# Patient Record
Sex: FEMALE | Race: WHITE
[De-identification: names, ages, dates, MRNs, and addresses within clinical notes are randomized per-mention and may not be internally consistent; named-entity substitution may affect disease eponyms.]

---

## 2018-08-12 ENCOUNTER — HOSPITAL ENCOUNTER (EMERGENCY)
Dept: HOSPITAL 62 - ER | Age: 48
Discharge: HOME | End: 2018-08-12
Payer: COMMERCIAL

## 2018-08-12 VITALS — DIASTOLIC BLOOD PRESSURE: 71 MMHG | SYSTOLIC BLOOD PRESSURE: 119 MMHG

## 2018-08-12 DIAGNOSIS — R22.0: ICD-10-CM

## 2018-08-12 DIAGNOSIS — F17.200: ICD-10-CM

## 2018-08-12 DIAGNOSIS — H57.11: ICD-10-CM

## 2018-08-12 DIAGNOSIS — H10.33: Primary | ICD-10-CM

## 2018-08-12 PROCEDURE — 99282 EMERGENCY DEPT VISIT SF MDM: CPT

## 2018-08-12 NOTE — ER DOCUMENT REPORT
ED Eye Complaint





- General


Chief Complaint: Eye Problem


Stated Complaint: SWOLLEN RIGHT EYE


Time Seen by Provider: 08/12/18 10:18


Mode of Arrival: Ambulatory


Information source: Patient


TRAVEL OUTSIDE OF THE [place] IN LAST 30 DAYS: No





- HPI


Onset: This morning


Eye location: Bilateral


Injury: No


Occurred at: Home


Quality of pain: Burning


Severity: Mild


Pain Level: 3


Contact lenses worn: No


Associated symptoms: Burning, Itching





- Related Data


Allergies/Adverse Reactions: 


 





Penicillins Allergy (Unknown, Verified 08/12/18 08:03)


 











Past Medical History





- Social History


Smoking Status: Current Every Day Smoker


Chew tobacco use (# tins/day): No


Frequency of alcohol use: Occasional


Drug Abuse: None


Family History: Reviewed & Not Pertinent


Patient has suicidal ideation: No


Patient has homicidal ideation: No


Renal/ Medical History: Denies: Hx Peritoneal Dialysis


Past Surgical History: Reports: Hx Tubal Ligation





- Immunizations


Hx Diphtheria, Pertussis, Tetanus Vaccination: Yes





Review of Systems





- Review of Systems


Constitutional: denies: Chills, Fever


EENT: Eye pain, Eye discharge, Tearing


Cardiovascular: No symptoms reported


Respiratory: No symptoms reported


Gastrointestinal: No symptoms reported


Genitourinary: No symptoms reported


Female Genitourinary: No symptoms reported


Musculoskeletal: No symptoms reported


Skin: No symptoms reported


Hematologic/Lymphatic: No symptoms reported


Neurological/Psychological: No symptoms reported


-: Yes All other systems reviewed and negative





Physical Exam





- Vital signs


Vitals: 


 











Temp Pulse Resp BP Pulse Ox


 


 98.7 F   79   18   120/68   99 


 


 08/12/18 08:07  08/12/18 08:07  08/12/18 08:07  08/12/18 08:07  08/12/18 08:07














- General


General appearance: Appears well, Alert


In distress: None





- HEENT


Head: Normocephalic, Atraumatic


Eyes: Other - Bilateral conjunctivitis


Conjunctiva: Injected, Purulent discharge


Cornea: Normal, Other - Fluoresein stain showed no corneal abrasion.


Extraocular movements intact: Yes


Eyelashes: Normal


Pupils: PERRL


Visual acuity- Right eye: 20/40


Visual acuity- Left eye: 20/50


Visual acuity- Both eyes: 20/40


Corrective lenses worn: No


Pharynx: Normal


Neck: Normal





- Respiratory


Respiratory status: No respiratory distress


Chest status: Nontender


Breath sounds: Normal


Chest palpation: Normal





- Cardiovascular


Rhythm: Regular


Heart sounds: Normal auscultation


Murmur: No





- Abdominal


Inspection: Normal


Distension: No distension


Bowel sounds: Normal


Tenderness: Nontender


Organomegaly: No organomegaly





- Extremities


General upper extremity: Normal inspection, Nontender, Normal color, Normal ROM

, Normal temperature


General lower extremity: Normal inspection, Nontender, Normal color, Normal ROM

, Normal temperature, Normal weight bearing.  No: Denis's sign





- Neurological


Neuro grossly intact: Yes


Cognition: Normal


Orientation: AAOx4


Massiel Coma Scale Eye Opening: Spontaneous


Whiteface Coma Scale Verbal: Oriented


Whiteface Coma Scale Motor: Obeys Commands


Whiteface Coma Scale Total: 15


Speech: Normal


Motor strength normal: LUE, RUE, LLE, RLE


Sensory: Normal





- Psychological


Associated symptoms: Normal affect, Normal mood





- Skin


Skin Temperature: Warm


Skin Moisture: Dry


Skin Color: Normal





Course





- Vital Signs


Vital signs: 


 











Temp Pulse Resp BP Pulse Ox


 


 98.3 F   63   16   119/71   97 


 


 08/12/18 11:45  08/12/18 11:45  08/12/18 11:45  08/12/18 11:45  08/12/18 11:45














Discharge





- Discharge


Clinical Impression: 


 Acute bacterial conjunctivitis of both eyes





Condition: Stable


Disposition: HOME, SELF-CARE


Instructions:  Conjunctivitis ([facility])


Additional Instructions: 


Please follow-up with your primary doctor tomorrow morning.  Return to the 

emergency room if condition worsens.


Prescriptions: 


Tobramycin Sulfate/Dexameth [Tobradex Oph Drops 2.5 Ml Bottle] 1 drop OU TID #1 

bottle


Referrals: 


KRISTAL ARORYO PA-C [Primary Care Provider] - Follow up as needed

## 2019-04-22 ENCOUNTER — HOSPITAL ENCOUNTER (EMERGENCY)
Dept: HOSPITAL 62 - ER | Age: 49
Discharge: HOME | End: 2019-04-22
Payer: COMMERCIAL

## 2019-04-22 VITALS — DIASTOLIC BLOOD PRESSURE: 75 MMHG | SYSTOLIC BLOOD PRESSURE: 119 MMHG

## 2019-04-22 DIAGNOSIS — K57.30: Primary | ICD-10-CM

## 2019-04-22 DIAGNOSIS — R10.9: ICD-10-CM

## 2019-04-22 DIAGNOSIS — Z79.899: ICD-10-CM

## 2019-04-22 DIAGNOSIS — R10.13: ICD-10-CM

## 2019-04-22 DIAGNOSIS — R10.2: ICD-10-CM

## 2019-04-22 DIAGNOSIS — F17.210: ICD-10-CM

## 2019-04-22 DIAGNOSIS — R11.2: ICD-10-CM

## 2019-04-22 DIAGNOSIS — D72.829: ICD-10-CM

## 2019-04-22 DIAGNOSIS — R10.31: ICD-10-CM

## 2019-04-22 LAB
ADD MANUAL DIFF: NO
ALBUMIN SERPL-MCNC: 4.7 G/DL (ref 3.5–5)
ALP SERPL-CCNC: 78 U/L (ref 38–126)
ALT SERPL-CCNC: 27 U/L (ref 9–52)
ANION GAP SERPL CALC-SCNC: 8 MMOL/L (ref 5–19)
APPEARANCE UR: (no result)
APTT PPP: YELLOW S
AST SERPL-CCNC: 24 U/L (ref 14–36)
BASOPHILS # BLD AUTO: 0 10^3/UL (ref 0–0.2)
BASOPHILS NFR BLD AUTO: 0.2 % (ref 0–2)
BILIRUB DIRECT SERPL-MCNC: 0.3 MG/DL (ref 0–0.4)
BILIRUB SERPL-MCNC: 0.4 MG/DL (ref 0.2–1.3)
BILIRUB UR QL STRIP: NEGATIVE
BUN SERPL-MCNC: 14 MG/DL (ref 7–20)
CALCIUM: 10.8 MG/DL (ref 8.4–10.2)
CHLORIDE SERPL-SCNC: 103 MMOL/L (ref 98–107)
CO2 SERPL-SCNC: 30 MMOL/L (ref 22–30)
EOSINOPHIL # BLD AUTO: 0 10^3/UL (ref 0–0.6)
EOSINOPHIL NFR BLD AUTO: 0.3 % (ref 0–6)
ERYTHROCYTE [DISTWIDTH] IN BLOOD BY AUTOMATED COUNT: 14 % (ref 11.5–14)
GLUCOSE SERPL-MCNC: 130 MG/DL (ref 75–110)
GLUCOSE UR STRIP-MCNC: NEGATIVE MG/DL
HCT VFR BLD CALC: 51.1 % (ref 36–47)
HGB BLD-MCNC: 17.3 G/DL (ref 12–15.5)
KETONES UR STRIP-MCNC: NEGATIVE MG/DL
LIPASE SERPL-CCNC: 98.8 U/L (ref 23–300)
LYMPHOCYTES # BLD AUTO: 0.8 10^3/UL (ref 0.5–4.7)
LYMPHOCYTES NFR BLD AUTO: 6 % (ref 13–45)
MCH RBC QN AUTO: 29 PG (ref 27–33.4)
MCHC RBC AUTO-ENTMCNC: 34 G/DL (ref 32–36)
MCV RBC AUTO: 86 FL (ref 80–97)
MONOCYTES # BLD AUTO: 0.5 10^3/UL (ref 0.1–1.4)
MONOCYTES NFR BLD AUTO: 3.4 % (ref 3–13)
NEUTROPHILS # BLD AUTO: 12.4 10^3/UL (ref 1.7–8.2)
NEUTS SEG NFR BLD AUTO: 90.1 % (ref 42–78)
NITRITE UR QL STRIP: NEGATIVE
PH UR STRIP: 6 [PH] (ref 5–9)
PLATELET # BLD: 346 10^3/UL (ref 150–450)
POTASSIUM SERPL-SCNC: 4.5 MMOL/L (ref 3.6–5)
PROT SERPL-MCNC: 7.8 G/DL (ref 6.3–8.2)
PROT UR STRIP-MCNC: NEGATIVE MG/DL
RBC # BLD AUTO: 5.97 10^6/UL (ref 3.72–5.28)
SODIUM SERPL-SCNC: 140.5 MMOL/L (ref 137–145)
SP GR UR STRIP: 1.02
TOTAL CELLS COUNTED % (AUTO): 100 %
UROBILINOGEN UR-MCNC: NEGATIVE MG/DL (ref ?–2)
WBC # BLD AUTO: 13.8 10^3/UL (ref 4–10.5)

## 2019-04-22 PROCEDURE — 96365 THER/PROPH/DIAG IV INF INIT: CPT

## 2019-04-22 PROCEDURE — S0028 INJECTION, FAMOTIDINE, 20 MG: HCPCS

## 2019-04-22 PROCEDURE — 81001 URINALYSIS AUTO W/SCOPE: CPT

## 2019-04-22 PROCEDURE — 74177 CT ABD & PELVIS W/CONTRAST: CPT

## 2019-04-22 PROCEDURE — 76705 ECHO EXAM OF ABDOMEN: CPT

## 2019-04-22 PROCEDURE — 99284 EMERGENCY DEPT VISIT MOD MDM: CPT

## 2019-04-22 PROCEDURE — 85025 COMPLETE CBC W/AUTO DIFF WBC: CPT

## 2019-04-22 PROCEDURE — 96361 HYDRATE IV INFUSION ADD-ON: CPT

## 2019-04-22 PROCEDURE — 74176 CT ABD & PELVIS W/O CONTRAST: CPT

## 2019-04-22 PROCEDURE — 80053 COMPREHEN METABOLIC PANEL: CPT

## 2019-04-22 PROCEDURE — 36415 COLL VENOUS BLD VENIPUNCTURE: CPT

## 2019-04-22 PROCEDURE — 83690 ASSAY OF LIPASE: CPT

## 2019-04-22 PROCEDURE — 96372 THER/PROPH/DIAG INJ SC/IM: CPT

## 2019-04-22 PROCEDURE — 96375 TX/PRO/DX INJ NEW DRUG ADDON: CPT

## 2019-04-22 NOTE — RADIOLOGY REPORT (SQ)
EXAM DESCRIPTION:  CT ABD/PELVIS WITH IV ONLY



COMPLETED DATE/TIME:  4/22/2019 1:37 pm



REASON FOR STUDY:  RLQ abd pain, leukocytosis



COMPARISON:  None.



TECHNIQUE:  CT scan of the abdomen and pelvis performed using helical scanning technique with dynamic
 intravenous contrast injection.  No oral contrast. Images reviewed with lung, soft tissue, and bone 
windows. Reconstructed coronal and sagittal MPR images reviewed. Delayed images for evaluation of the
 urinary system also acquired. All images stored on PACS.

All CT scanners at this facility use dose modulation, iterative reconstruction, and/or weight based d
osing when appropriate to reduce radiation dose to as low as reasonably achievable (ALARA).

[facility]: Dose Right  [facility]: CareDose    [facility]: Dose Right    [facility]: Teradose 4D    [facility]: Smart Technologies



CONTRAST TYPE AND DOSE:  contrast/concentration: Isovue 350.00 mg/ml; Total Contrast Delivered: 80.0 
ml; Total Saline Delivered: 68.0 ml



RENAL FUNCTION:  None required. The patient is less than 50 years old.



RADIATION DOSE:  CT Rad equipment meets quality standard of care and radiation dose reduction techniq
ues were employed. CTDIvol: 7.0 - 9.9 mGy. DLP: 874 mGy-cm..



LIMITATIONS:  None.



FINDINGS:  LOWER CHEST:  Bibasilar atelectasis.

LIVER: Normal size. No masses.  No dilated ducts.  The hepatic and portal veins are patent.

SPLEEN: Normal size. No focal lesions.

PANCREAS: No masses. No significant calcifications. No adjacent inflammation or peripancreatic fluid 
collections. Pancreatic duct not dilated.

GALLBLADDER: No identified stones by CT criteria. No inflammatory changes to suggest cholecystitis.

ADRENAL GLANDS: No significant masses or asymmetry.

RIGHT KIDNEY AND URETER: No solid masses.   No significant calcifications.   No hydronephrosis or hyd
roureter.

LEFT KIDNEY AND URETER: No solid masses.   No significant calcifications.   No hydronephrosis or hydr
oureter.

AORTA AND VESSELS: No aneurysm. No dissection. Renal arteries, SMA, celiac without stenosis.

RETROPERITONEUM: No retroperitoneal adenopathy, hemorrhage or masses.

BOWEL AND PERITONEAL CAVITY:  Several dilated fluid filled small bowel loops in the abdomen and pelvi
s.  Fecalization is identified within a dilated bowel loop in the mid-lower quadrant of the abdomen o
n the right.  Small amount of free fluid in the right pericolonic gutter and the pelvic region.  A po
int of transition is not visualized.  Considerations for these findings include incomplete small caroline
l obstruction.

Colonic diverticulae.  The stomach is incompletely distended which limits evaluation.

APPENDIX:  The visualized appendix, axial images 56--58, series 3 is unremarkable in appearance.

PELVIS: No mass.  No free fluid. Normal bladder.

ABDOMINAL WALL: No masses. No hernias.

BONES: No significant or acute findings.

OTHER: No other significant finding.



IMPRESSION:  1.  There are dilated small bowel loops within the abdomen and pelvis.  Fecalization wit
hin a dilated loop of small bowel in the mid-lower quadrant of the abdomen on the right.  Transition 
point is not identified.  Small amount of free fluid in the right pericolonic gutter and the pelvic r
egion.  Considerations for these findings include incomplete small bowel obstruction.

2.  The visualized appendix by CT examination is unremarkable in appearance.

3.  Colonic diverticulae.

4.  Bibasilar atelectasis.



COMMENT:  1.  The results of this examination were discussed with the emergency department provider o
n 4/22/2019 at 13:56 hours.



TECHNICAL DOCUMENTATION:  JOB ID:  [id number]

Quality ID # 436: Final reports with documentation of one or more dose reduction techniques (e.g., Au
tomated exposure control, adjustment of the mA and/or kV according to patient size, use of iterative 
reconstruction technique)

 2011 [employer]- All Rights Reserved



Reading location - IP/workstation name: [email or web address]

## 2019-04-22 NOTE — RADIOLOGY REPORT (SQ)
EXAM DESCRIPTION:  U/S ABDOMEN LIMITED W/O DOP



COMPLETED DATE/TIME:  4/22/2019 12:03 pm



REASON FOR STUDY:  upper abdo pain



COMPARISON:  None.



TECHNIQUE:  Dynamic and static grayscale images acquired of the abdomen and recorded on PACS. Additio
nal selected color Doppler and spectral images recorded.



LIMITATIONS:  None.



FINDINGS:  PANCREAS: No masses.  Visualized pancreatic duct normal caliber.

LIVER:  The liver measures 17.5 cm in length, upper limits of normal to slightly prominent in size. N
o masses. Echotexture normal.

LIVER VASCULATURE: Normal directional flow of the main portal vein and hepatic veins.

GALLBLADDER: No stones.  The gallbladder wall measures 3.0 mm, upper limits of normal wall thickness.
  No pericholecystic fluid.

ULTRASOUND-DETECTED HOLLOWAY'S SIGN: Negative.

INTRAHEPATIC DUCTS AND COMMON DUCT: CBD measures 2.0 mm in diameter, normal.  The intrahepatic ducts 
normal caliber. No filling defects.

INFERIOR VENA CAVA: Normal flow.

AORTA: No aneurysm.

RIGHT KIDNEY:  The right kidney measures 10.0 cm in length, normal size. Normal echogenicity. No marina
d or suspicious masses. No hydronephrosis. No calcifications.

PERITONEAL AND RIGHT PLEURAL SPACE: No ascites or effusions.

OTHER: No other significant findings.



IMPRESSION:  1.  NORMAL RIGHT UPPER QUADRANT ULTRASOUND.



TECHNICAL DOCUMENTATION:  JOB ID:  [id number]

 2011 [employer]- All Rights Reserved



Reading location - IP/workstation name: [email or web address]

## 2019-04-22 NOTE — PDOC CONSULTATION
Consultation


Attending physician:: JUAN RANGEL


Consult reason:: Abdominal pain





History of Present Illness


Admission Date/PCP: 


  





  KRISTAL WILSON PA-C





Patient complains of: Abdominal pain


History of Present Illness: 


CONSTANCE MCCORMACK is a 48 year old female


Who presents emerged brought by ground rescue complaining of a week and has 

history of abdominal pain, associated nausea and anorexia.  She was seen last 

week at Kristal wilson's office, diagnosed with early urinary tract infection was 

started on nitrofurantoin.  Because of persisting abdominal pain she was seen in

the emergency department by Dr. Juan Villela earlier today, found to have 

abdominal tenderness periumbilical.  CT scan of the abdomen and pelvis with IV 

some fluid in the pelvis, if equalization of the loop of small bowel.  The CT s

can was repeated with oral contrast and was interpreted as no evidence of bowel 

obstruction, but several loops of small bowel with thickened wall, could not 

rule out infectious etiology.  There is no evidence of tumor, perforation, 

fluid, phlegmon pericolonic inflammation.  She did have scattered diverticulosis

of the sigmoid colon.  Surgery was consulted and she was evaluated in the 

emergency department.  At that time she was feeling better





Past Medical History


Past Medical History: 





History of gastroenteritis, smoking





Past Surgical History


Past Surgical History: Reports: Tubal Ligation





Social History


Smoking Status: Current Every Day Smoker


Hx Recreational Drug Use: No


Hx Prescription Drug Abuse: No





Family History


Family History: None, Reviewed & Not Pertinent


Parental Family History Reviewed: Yes


Children Family History Reviewed: Yes


Sibling(s) Family History Reviewed.: Yes





Medication/Allergy


Home Medications: 








Ciprofloxacin HCl [Cipro 500 mg Tablet] 500 mg PO BID 10 Days #20 tablet 

04/22/19 


Metronidazole [Flagyl 500 mg Tablet] 500 mg PO Q8H 10 Days #30 tablet 04/22/19 








Allergies/Adverse Reactions: 


                                        





Penicillins Allergy (Unknown, Verified 04/22/19 13:19)


   











Review of Systems


Constitutional: PRESENT: as per HPI


Eyes: ABSENT: visual disturbances


Ears: ABSENT: hearing changes


Cardiovascular: ABSENT: chest pain, dyspnea on exertion, edema, orthropnea, 

palpitations


Respiratory: ABSENT: cough, hemoptysis


Gastrointestinal: PRESENT: as per HPI


Integumentary: ABSENT: rash, wounds


Neurological: ABSENT: abnormal gait, abnormal speech, confusion, dizziness, 

focal weakness, syncope





Physical Exam


Vital Signs: 


                                        











Temp Pulse Resp BP Pulse Ox


 


 98.0 F   77   16   144/83 H  100 


 


 04/22/19 14:40  04/22/19 14:40  04/22/19 14:40  04/22/19 14:40  04/22/19 14:40








                                 Intake & Output











 04/21/19 04/22/19 04/23/19





 06:59 06:59 06:59


 


Weight   70.5 kg











General appearance: PRESENT: no acute distress


Head exam: PRESENT: normocephalic


Eye exam: PRESENT: EOMI


Neck exam: PRESENT: full ROM


Respiratory exam: PRESENT: rhonchi


Pulses: PRESENT: normal carotid pulses, normal radial pulses


GI/Abdominal exam: PRESENT: other - Abdomen fairly soft no peritoneal signs no 

rigidity some tenderness in the deep pelvis to palpation but does not localize.


Rectal exam: PRESENT: deferred


Musculoskeletal exam: PRESENT: full ROM





Results


Laboratory Results: 


                                        





                                 04/22/19 10:41 





                                 04/22/19 10:41 





                                        











  04/22/19 04/22/19 04/22/19





  10:41 10:41 10:59


 


WBC  13.8 H  


 


RBC  5.97 H  


 


Hgb  17.3 H  


 


Hct  51.1 H  


 


MCV  86  


 


MCH  29.0  


 


MCHC  34.0  


 


RDW  14.0  


 


Plt Count  346  


 


Seg Neutrophils %  90.1 H  


 


Lymphocytes %  6.0 L  


 


Monocytes %  3.4  


 


Eosinophils %  0.3  


 


Basophils %  0.2  


 


Absolute Neutrophils  12.4 H  


 


Absolute Lymphocytes  0.8  


 


Absolute Monocytes  0.5  


 


Absolute Eosinophils  0.0  


 


Absolute Basophils  0.0  


 


Sodium   140.5 


 


Potassium   4.5 


 


Chloride   103 


 


Carbon Dioxide   30 


 


Anion Gap   8 


 


BUN   14 


 


Creatinine   0.78 


 


Est GFR ( Amer)   > 60 


 


Est GFR (Non-Af Amer)   > 60 


 


Glucose   130 H 


 


Calcium   10.8 H 


 


Total Bilirubin   0.4 


 


AST   24 


 


ALT   27 


 


Alkaline Phosphatase   78 


 


Total Protein   7.8 


 


Albumin   4.7 


 


Lipase   98.8 


 


Urine Color    YELLOW


 


Urine Appearance    CLOUDY


 


Urine pH    6.0


 


Ur Specific Gravity    1.017


 


Urine Protein    NEGATIVE


 


Urine Glucose (UA)    NEGATIVE


 


Urine Ketones    NEGATIVE


 


Urine Blood    SMALL H


 


Urine Nitrite    NEGATIVE


 


Ur Leukocyte Esterase    NEGATIVE


 


Urine WBC (Auto)    1


 


Urine RBC (Auto)    3











Impressions: 


                                        





Abdomen Ultrasound  04/22/19 10:31


IMPRESSION:  1.  NORMAL RIGHT UPPER QUADRANT ULTRASOUND.


 








Abdomen/Pelvis CT  04/22/19 14:08


IMPRESSION:  There are a few minimally dilated small-bowel loops in the left mid

abdomen with mild wall thickening, no evidence for mechanical obstruction as 

contrast passes through the entire small bowel into the colon.  The this yohan

earance could be related to infection or inflammatory process, recommend 

radiographic follow-up if there is persistent concern for developing or 

recurring obstruction.


 














Assessment & Plan





- Diagnosis


(1) Abdominal pain


Qualifiers: 


   Abdominal location: generalized   Qualified Code(s): R10.84 - Generalized 

abdominal pain   


Plan: 


Impression: Nonspecific abdominal pain, mild leukocytosis, and nonspecific small

bowel thickening on CT scan; no clinical or radiographic evidence of an acute 

abdomen.  Suspect mild gastroenteritis, possibly low-grade sigmoid divertic

ulitis 





Recommendations:





1.  Explained findings to patient and significant other.  There is no clinical 

indication for surgical intervention; I think the patient can be managed with 

diet advancement, and clinical reassessment.  If patient doing well, can be 

managed on outpatient basis with a short course of p.o. antibiotics.





2.  Conversely if the patient deteriorates clinically, then admission may be 

warranted.





3.  I johnny the above with Dr. Loredo, emergency room physician.  He agrees 

with the above.





4.  Otherwise can follow-up with the patient on a as needed basis.








(2) Smoker


Is this a current diagnosis for this admission?: Yes   





(3) Diverticula of colon


Is this a current diagnosis for this admission?: Yes   





(4) Leukocytosis


Qualifiers: 


   Leukocytosis type: unspecified   Qualified Code(s): D72.829 - Elevated white 

blood cell count, unspecified   


Is this a current diagnosis for this admission?: Yes

## 2019-04-22 NOTE — RADIOLOGY REPORT (SQ)
EXAM DESCRIPTION:  CT ABD/PELVIS ORAL ONLY



COMPLETED DATE/TIME:  4/22/2019 5:01 pm



REASON FOR STUDY:  Abnormal IV contrasted CT scan of the abd pelvis



COMPARISON:   Earlier exam same date



TECHNIQUE:  CT scan of the abdomen and pelvis performed using helical scanning technique with oral co
ntrast only. Images reviewed with lung, soft tissue, and bone windows. Reconstructed coronal and sagi
ttal MPR images reviewed. Delayed images for evaluation of the urinary system also acquired. All imag
es stored on PACS.

All CT scanners at this facility use dose modulation, iterative reconstruction, and/or weight based d
osing when appropriate to reduce radiation dose to as low as reasonably achievable (ALARA).

[facility]: Dose Right  [facility]: CareDose    [facility]: Dose Right    [facility]: Teradose 4D    [facility]: [employer]



CONTRAST TYPE AND DOSE:  Oral only .



RENAL FUNCTION:  None required. The patient is less than 50 years old.



RADIATION DOSE:  CT Rad equipment meets quality standard of care and radiation dose reduction techniq
ues were employed. CTDIvol: 8.6 mGy. DLP: 452 mGy-cm..



LIMITATIONS:  None.



FINDINGS:  LOWER CHEST: No significant findings.

LIVER: Normal size. No enhancing masses.  No dilated ducts.

SPLEEN: Normal size. No focal lesions.

PANCREAS: No masses identified. No significant calcifications. No adjacent inflammation or peripancre
atic fluid collections. Pancreatic duct not dilated.

GALLBLADDER: No calcified stones. No inflammatory changes to suggest cholecystitis.

ADRENAL GLANDS: No significant masses.

RIGHT KIDNEY AND URETER: No cysts identified. No solid masses identified. No calcified stones. No hyd
ronephrosis or hydroureter.

LEFT KIDNEY AND URETER: No cysts identified. No solid masses identified. No calcified stones. No hydr
onephrosis or hydroureter.

AORTA AND VESSELS: No aneurysm. No dissection. Renal arteries, SMA, celiac without significant stenos
is.

RETROPERITONEUM: No bulky retroperitoneal adenopathy.

BOWEL AND PERITONEAL CAVITY: There are a few minimally dilated small-bowel loops with mild wall thick
ening, no evidence for obstruction as contrast passes through the entire small bowel into the colon. 
 Diverticulosis.

APPENDIX: Normal.

PELVIS: Trace free fluid. Unremarkable bladder.

ABDOMINAL WALL: No masses. No hernias.

BONES: No acute findings.

OTHER: No other significant finding.



IMPRESSION:  There are a few minimally dilated small-bowel loops in the left mid abdomen with mild wa
ll thickening, no evidence for mechanical obstruction as contrast passes through the entire small bow
el into the colon.  The this appearance could be related to infection or inflammatory process, recomm
end radiographic follow-up if there is persistent concern for developing or recurring obstruction.



TECHNICAL DOCUMENTATION:  JOB ID:  [id number]

[id number]

Quality ID # [id number]: Final reports with documentation of one or more dose reduction techniques (e.g., Au
tomated exposure control, adjustment of the mA and/or kV according to patient size, use of iterative 
reconstruction technique)

 2011 [employer]- All Rights Reserved



Reading location - IP/workstation name: [email or web address]

## 2019-04-22 NOTE — ER DOCUMENT REPORT
Entered by CORBY MONTES DE OCA SCRIBE  04/22/19 1253 





Acting as scribe for:IVANA RANGEL MD





ED General





- General


Mode of Arrival: Ambulatory


Information source: Patient


TRAVEL OUTSIDE OF THE [place] IN LAST 30 DAYS: No





<IVANA RANGEL - Last Filed: 04/22/19 14:10>





<HECTOR SANCHEZ - Last Filed: 04/22/19 18:20>





- General


Chief Complaint: Abdominal Pain


Stated Complaint: PELVIC PAIN


Time Seen by Provider: 04/22/19 10:26


Primary Care Provider: 


KRISTAL ARROYO PA-C [Primary Care Provider] - Follow up as needed


Notes: 


Patient is a 48 year old female presenting to the emergency department 

complaining of abdominal pain with associated nausea and vomiting onset last 

night. Patient describes the pain as a "starving pains" further stating "it 

feels as if I'm hungry all the time, even after eating". She states the pain 

located in the epigastric region also reports abdominal cramping. Patient states

her epigastric pain has somewhat resolved after having a GI cocktail.





Patient states she had similar symptoms 2 weeks ago and was diagnosed with 

gastritis and prescribed Macrodantin (which she has not taken). Patient states 

the symptoms resolved approximately 4-5 days later. 





Of note, patient was diagnosed with H. pylori on 10/9/2018 and was prescribed 

Doxycycline, Levaquin and omeprazole.





Patient states she takes Motrin as needed for chronic back pain. 


 (CORBY MONTES DE OCA)


Patient is a 48 year old female presenting to the emergency department complaini

ng of abdominal pain with associated nausea and vomiting onset last night. 

Patient describes the pain as a "starving pains" further stating "it feels as if

I'm hungry all the time, even after eating". She states the pain located in the 

epigastric region also reports abdominal cramping. Patient states her epigastric

pain has somewhat resolved after having a GI cocktail.





Patient states she had similar symptoms 2 weeks ago and was diagnosed with 

gastritis and prescribed Macrodantin (which she has not taken). Patient states 

the symptoms resolved approximately 4-5 days later. 





Of note, patient was diagnosed with H. pylori on 10/9/2018 and was prescribed 

Doxycycline, Levaquin and omeprazole.





Patient states she takes Motrin as needed for chronic back pain. 


 (IVANA RANGEL)





- Related Data


Allergies/Adverse Reactions: 


                                        





Penicillins Allergy (Unknown, Verified 04/22/19 13:19)


   











Past Medical History





- General


Information source: Patient





- Social History


Smoking Status: Current Every Day Smoker


Cigarette use (# per day): Yes - 0.5 PPD


Chew tobacco use (# tins/day): No


Smoking Education Provided: No


Frequency of alcohol use: Occasional


Drug Abuse: None


Family History: Reviewed & Not Pertinent


Patient has suicidal ideation: No


Patient has homicidal ideation: No


Past Surgical History: Reports: Hx Oral Surgery - Tooth extractions, Hx Tubal 

Ligation





- Immunizations


Hx Diphtheria, Pertussis, Tetanus Vaccination: Yes





<IVANA RANGEL - Last Filed: 04/22/19 14:10>





Review of Systems





- Review of Systems


Constitutional: No symptoms reported


EENT: No symptoms reported


Respiratory: No symptoms reported


Gastrointestinal: See HPI, Abdominal pain, Nausea, Vomiting


Genitourinary: No symptoms reported


Female Genitourinary: No symptoms reported


Musculoskeletal: No symptoms reported


Skin: No symptoms reported


Hematologic/Lymphatic: No symptoms reported


Neurological/Psychological: No symptoms reported


-: Yes All other systems reviewed and negative





<IVANA RANGEL - Last Filed: 04/22/19 14:10>





Physical Exam





<IVANA RANGEL - Last Filed: 04/22/19 14:10>





- Vital signs


Vitals: 





                                        











Temp Pulse Resp BP Pulse Ox


 


 97.6 F   83   14   143/96 H  95 


 


 04/22/19 10:10  04/22/19 10:10  04/22/19 10:10  04/22/19 10:10  04/22/19 10:10














- Notes


Notes: 


 


GENERAL: Alert, interacts well. No acute distress.


HEAD: Normocephalic, atraumatic.


EYES: Pupils equal, round, and reactive to light. Extraocular movements intact.


ENT: Oral mucosa moist, tongue midline. 


NECK: Full range of motion. Supple. Trachea midline.


LUNGS: Clear to auscultation bilaterally, no wheezes, rales, or rhonchi. No 

respiratory distress.


HEART: Regular rate and rhythm. No murmurs, gallops, or rubs.


ABDOMEN: Soft, epigastric tenderness to palpation, worse in the RLQ, positive 

McBurney's point, rebounding, guarding. Complains of pain in RLQ with heel drop.

Non-distended. Bowel sounds present in all 4 quadrants. 


EXTREMITIES: Moves all 4 extremities spontaneously. 


NEUROLOGICAL: Alert and oriented x3. Normal speech. 


PSYCH: Normal affect, normal mood.


SKIN: Warm, dry, normal turgor. No rashes or lesions noted.


 (CORBY MONTES DE OCA)


 


GENERAL: Alert, interacts well. No acute distress.


HEAD: Normocephalic, atraumatic.


EYES: Pupils equal, round, and reactive to light. Extraocular movements intact.


ENT: Oral mucosa moist, tongue midline. 


NECK: Full range of motion. Supple. Trachea midline.


LUNGS: Clear to auscultation bilaterally, no wheezes, rales, or rhonchi. No 

respiratory distress.


HEART: Regular rate and rhythm. No murmurs, gallops, or rubs.


ABDOMEN: Soft, epigastric tenderness to palpation, worse in the RLQ, positive 

McBurney's point, rebounding, guarding. Complains of pain in RLQ with heel drop.

Non-distended. Bowel sounds present in all 4 quadrants. 


EXTREMITIES: Moves all 4 extremities spontaneously. 


NEUROLOGICAL: Alert and oriented x3. Normal speech. 


PSYCH: Normal affect, normal mood.


SKIN: Warm, dry, normal turgor. No rashes or lesions noted.


 (IVANA RANGEL)





Course





- Laboratory


Result Diagrams: 


                                 04/22/19 10:41





                                 04/22/19 10:41





- Diagnostic Test


Radiology reviewed: Image reviewed, Reports reviewed - RUQ Abdominal ultrasound 

is unremarkable. IV contrasted CT scan of the abdomen and pelvis shows what is 

thought to be equalization in the small bowel in the right abdomen, possibly due

to a small bowel obstruction.  There is also free fluid in the right paracolic 

gutter.





- Consults


  ** Dr. Magallon                                                               

                


Consulted provider: other - Request the patient receive oral contrast and repeat

the scan.





- Transfer of Care


Care transferred to following provider: Dr. Sanchez





<IVANA RANGEL - Last Filed: 04/22/19 14:10>





- Laboratory


Result Diagrams: 


                                 04/22/19 10:41





                                 04/22/19 10:41





<HECTOR SANCHEZ - Last Filed: 04/22/19 18:20>





- Re-evaluation


Re-evalutation: 





04/22/19 18:15


Patient signed over to me at 1400 4 repeat CT scan and surgical consult.  Dr. Magallon has seen the patient.  He does not feel that patient has a surgical 

abdomen.  He has reviewed the CT scan himself.  He sees some diverticular 

disease.  Based on this fact that patient has a slightly elevated WBC count and 

some diverticular disease we are going to put her on some antibiotics and treat 

for that.  At this time we will start her on some Cipro and Flagyl and advise 

close follow-up for worsening symptoms especially in the next 24 hours. 

(HECTOR SANCHEZ)





- Vital Signs


Vital signs: 





                                        











Temp Pulse Resp BP Pulse Ox


 


 98.0 F   77   16   144/83 H  100 


 


 04/22/19 14:40  04/22/19 14:40  04/22/19 14:40  04/22/19 14:40  04/22/19 14:40














- Laboratory


Laboratory results interpreted by me: 





                                        











  04/22/19 04/22/19 04/22/19





  10:41 10:41 10:59


 


WBC  13.8 H  


 


RBC  5.97 H  


 


Hgb  17.3 H  


 


Hct  51.1 H  


 


Seg Neutrophils %  90.1 H  


 


Lymphocytes %  6.0 L  


 


Absolute Neutrophils  12.4 H  


 


Glucose   130 H 


 


Calcium   10.8 H 


 


Urine Blood    SMALL H














- Transfer of Care


Notes: 





04/22/19 14:19


Patient is pending oral contrast a CT scan of the abdomen pelvis, then repeat 

consultation with general surgery. (IVANA RANGEL)





Discharge





<IVANA RANGEL - Last Filed: 04/22/19 14:10>





<HECTOR SANCHEZ - Last Filed: 04/22/19 18:20>





- Discharge


Clinical Impression: 


 Diverticula of colon





Abdominal pain


Qualifiers:


 Abdominal location: right lower quadrant Qualified Code(s): R10.31 - Right 

lower quadrant pain





Leukocytosis


Qualifiers:


 Leukocytosis type: unspecified Qualified Code(s): D72.829 - Elevated white 

blood cell count, unspecified





Condition: Stable


Disposition: HOME, SELF-CARE


Instructions:  Abdominal Pain ([facility]), Diverticulitis ([facility])


Additional Instructions: 


In the event that your symptoms are getting worse over the next 24-48 hours then

please return for repeat evaluation.


Prescriptions: 


Ciprofloxacin HCl [Cipro 500 mg Tablet] 500 mg PO BID 10 Days #20 tablet


Metronidazole [Flagyl 500 mg Tablet] 500 mg PO Q8H 10 Days #30 tablet


Referrals: 


KRISTAL ARROYO PA-C [Primary Care Provider] - Follow up as needed


EITAN MAGALLON MD [ACTIVE STAFF] - Follow up as needed


Scribe Attestation: 





04/22/19 13:04


I personally performed the services described in the documentation, reviewed and

edited the documentation which was dictated to the scribe in my presence, and it

accurately records my words and actions. (CORBY MONTES DE OCA)





04/22/19 13:04


I personally performed the services described in the documentation, reviewed and

edited the documentation which was dictated to the scribe in my presence, and it

accurately records my words and actions. (IVANA RANGEL)





I personally performed the services described in the documentation, reviewed and

edited the documentation which was dictated to the scribe in my presence, and it

accurately records my words and actions.

## 2019-04-22 NOTE — ER DOCUMENT REPORT
ED Medical Screen (RME)





- General


Chief Complaint: Abdominal Pain


Stated Complaint: PELVIC PAIN


Time Seen by Provider: 04/22/19 10:26


Primary Care Provider: 


KRISTAL ARROYO PA-C [Primary Care Provider] - Follow up as needed


Mode of Arrival: Ambulatory


Information source: Patient


TRAVEL OUTSIDE OF THE [place] IN LAST 30 DAYS: No





- HPI


Patient complains to provider of: abdo pain


Notes: 





04/22/19 10:32


Patient here with complaints of upper abdominal pain.  States the pain is been 

present for over a week now.  She had nausea vomiting with it as well.  She was 

seen by her primary care doctor reports having plain x-rays done which she 

reports were normal.  She continues to have upper abdominal pain with nausea 

vomiting.  No fever.  No dysuria or hematuria.





Exam


Nontoxic, no distress.  Patient does not appear to be somewhat uncomfortable.  

Epigastric discomfort with palpation on limited triage abdominal exam.





Plan


CBC, CMP, urine, lipase, ultrasound of the right upper quadrant.  Zofran, 

Bentyl, GI cocktail, Pepcid.





An initial examination was made on the patient as part of the triage process, 

and it was determined a more comprehensive evaluation was necessary. Initial 

labs were ordered and patient was transferred to another provider in the ED who 

assumed care and finished evaluation and plan.








- Related Data


Allergies/Adverse Reactions: 


                                        





Penicillins Allergy (Unknown, Verified 08/12/18 08:03)


   











Past Medical History





- Social History


Frequency of alcohol use: Occasional


Drug Abuse: None


Renal/ Medical History: Denies: Hx Peritoneal Dialysis


Past Surgical History: Reports: Hx Tubal Ligation





- Immunizations


Hx Diphtheria, Pertussis, Tetanus Vaccination: Yes





Physical Exam





- Vital signs


Vitals: 





                                        











Temp Pulse Resp BP Pulse Ox


 


 97.6 F   83   14   143/96 H  95 


 


 04/22/19 10:10  04/22/19 10:10  04/22/19 10:10  04/22/19 10:10  04/22/19 10:10














Course





- Vital Signs


Vital signs: 





                                        











Temp Pulse Resp BP Pulse Ox


 


 97.6 F   83   14   143/96 H  95 


 


 04/22/19 10:10  04/22/19 10:10  04/22/19 10:10  04/22/19 10:10  04/22/19 10:10














Doctor's Discharge





- Discharge


Referrals: 


KRISTAL ARROYO PA-C [Primary Care Provider] - Follow up as needed

## 2019-10-28 ENCOUNTER — HOSPITAL ENCOUNTER (OUTPATIENT)
Dept: HOSPITAL 62 - RAD | Age: 49
End: 2019-10-28
Attending: PHYSICIAN ASSISTANT
Payer: COMMERCIAL

## 2019-10-28 DIAGNOSIS — R22.1: Primary | ICD-10-CM

## 2019-10-28 PROCEDURE — 76536 US EXAM OF HEAD AND NECK: CPT

## 2019-10-28 NOTE — RADIOLOGY REPORT (SQ)
EXAM DESCRIPTION:  U/S THYROID/SFT TISS HD   NECK



COMPLETED DATE/TIME:  10/28/2019 2:49 pm



REASON FOR STUDY:  R22.1 LOCALIZED SWELLING, MASS AND LUMP, NECK R22.1  LOCALIZED SWELLING, MASS AND 
LUMP, NECK



COMPARISON:  None.



TECHNIQUE:  Dynamic and static gray-scale images acquired of the thyroid gland. Selected additional c
olor/power Doppler images recorded.  All images stored to PACS.



LIMITATIONS:  None.



FINDINGS:  RIGHT LOBE: The right lobe of the thyroid gland is heterogeneous and it measures 4.1 x 1.3
 x 1.8 cm.  Within the midportion of the lobe there is solid, hypoechoic, wider than tall nodule with
 smooth margins, mild internal Doppler flow and no internal calcifications that measures 0.7 x 0.4 x 
0.8 cm.

LEFT LOBE: The left lobe of the thyroid gland is heterogeneous and measures 4.2 x 1.1 x 1.3 cm.  Ther
e is no discrete nodule.

ISTHMUS: The isthmus of the thyroid gland is heterogeneous and measures 4 mm in AP diameter.  There i
s no discrete nodule.

OTHER: At the site of pain, on the right side of the neck, there is a 2 x 0.6 x 1.3 cm level II lymph
 node.  The architecture of the node is preserved and its cortex measures 6 mm in thickness.



IMPRESSION:  1. Heterogeneous echotexture of the thyroid gland with a single 0.7 x 0.4 x 0.8 cm TR4 n
odule in the midportion of the right thyroid lobe.  A follow-up ultrasound in 1 year is recommended.

2.  Prominent 2 x 0.6 x 1.3 cm level II lymph node at the site of pain on the right side of the neck.
  The architecture of the node is preserved on its cortex measures 6 mm in thickness.  The lymph node
 could be reactive, however if it persists a repeat ultrasound or contrast - enhanced CT could be obt
ained for further evaluation.



TECHNICAL DOCUMENTATION:  JOB ID:  [id number]

 2011 [employer]- All Rights Reserved



Reading location - IP/workstation name: [email or web address]

## 2019-11-15 ENCOUNTER — HOSPITAL ENCOUNTER (OUTPATIENT)
Dept: HOSPITAL 62 - WI | Age: 49
End: 2019-11-15
Attending: PHYSICIAN ASSISTANT
Payer: COMMERCIAL

## 2019-11-15 DIAGNOSIS — Z12.31: Primary | ICD-10-CM

## 2019-11-15 PROCEDURE — 77063 BREAST TOMOSYNTHESIS BI: CPT

## 2019-11-15 PROCEDURE — 77067 SCR MAMMO BI INCL CAD: CPT

## 2019-11-15 NOTE — WOMENS IMAGING REPORT
EXAM DESCRIPTION:  3D SCREENING MAMMO BILAT



COMPLETED DATE/TIME:  11/15/2019 12:13 pm



REASON FOR STUDY:  Z12.31 SCREENING MAMMO Z12.31  ENCNTR SCREEN MAMMOGRAM FOR MALIGNANT NEOPLASM OF B
RE



COMPARISON:  None.



EXAM PARAMETERS:  Views: Standard craniocaudal and mediolateral oblique views of each breast recorded
 using digital acquisition and breast tomosynthesis.

Read with the assistance of CAD.

.[facility] - [employer]  Version 9.2



LIMITATIONS:  None.



FINDINGS:  No suspicious masses, suspicious calcifications or architectural distortion. No areas of c
oncern.



IMPRESSION:   NEGATIVE MAMMOGRAM. BIRADS 1.



BREAST DENSITY:  b. There are scattered areas of fibroglandular density.



BIRAD:  ASSESSMENT:  1 NEGATIVE



RECOMMENDATION:  ROUTINE SCREENING



COMMENT:  The patient has been notified of the results by letter per MQSA requirements. Additional no
tification policies are in place for contacting patient with suspicious or incomplete findings.

Quality ID #225: The American College of Radiology recommends an annual screening mammogram for women
 aged 40 years or over. This facility utilizes a reminder system to ensure that all patients receive 
reminder letters, and/or direct phone calls for appointments. This includes reminders for routine scr
eening mammograms, diagnostic mammograms, or other Breast Imaging Interventions when appropriate.  Th
is patient will be placed in the appropriate reminder system.



TECHNICAL DOCUMENTATION:  FINDING NUMBER: (1)

ASSESSMENT:  (1)

JOB ID:  [id number]

 2011 [employer]- All Rights Reserved



Reading location - IP/workstation name: [email or web address]